# Patient Record
Sex: FEMALE | Race: WHITE | NOT HISPANIC OR LATINO | URBAN - METROPOLITAN AREA
[De-identification: names, ages, dates, MRNs, and addresses within clinical notes are randomized per-mention and may not be internally consistent; named-entity substitution may affect disease eponyms.]

---

## 2017-08-08 ENCOUNTER — HISTORICAL (OUTPATIENT)
Dept: ADMINISTRATIVE | Facility: HOSPITAL | Age: 67
End: 2017-08-08

## 2020-02-26 ENCOUNTER — OFFICE VISIT (OUTPATIENT)
Dept: URGENT CARE | Facility: CLINIC | Age: 70
End: 2020-02-26
Payer: MEDICARE

## 2020-02-26 VITALS
BODY MASS INDEX: 26.37 KG/M2 | HEIGHT: 67 IN | TEMPERATURE: 99 F | HEART RATE: 72 BPM | WEIGHT: 168 LBS | DIASTOLIC BLOOD PRESSURE: 79 MMHG | OXYGEN SATURATION: 96 % | RESPIRATION RATE: 18 BRPM | SYSTOLIC BLOOD PRESSURE: 158 MMHG

## 2020-02-26 DIAGNOSIS — J32.9 SINUSITIS, UNSPECIFIED CHRONICITY, UNSPECIFIED LOCATION: Primary | ICD-10-CM

## 2020-02-26 PROCEDURE — 99203 OFFICE O/P NEW LOW 30 MIN: CPT | Mod: S$GLB,,, | Performed by: FAMILY MEDICINE

## 2020-02-26 PROCEDURE — 99203 PR OFFICE/OUTPT VISIT, NEW, LEVL III, 30-44 MIN: ICD-10-PCS | Mod: S$GLB,,, | Performed by: FAMILY MEDICINE

## 2020-02-26 RX ORDER — PREDNISONE 20 MG/1
TABLET ORAL
Qty: 10 TABLET | Refills: 1 | Status: SHIPPED | OUTPATIENT
Start: 2020-02-26 | End: 2023-01-12 | Stop reason: ALTCHOICE

## 2020-02-26 RX ORDER — MONTELUKAST SODIUM 10 MG/1
TABLET ORAL
COMMUNITY
Start: 2020-02-06 | End: 2023-01-12

## 2020-02-26 RX ORDER — AZELASTINE 1 MG/ML
1 SPRAY, METERED NASAL 2 TIMES DAILY
Qty: 30 ML | Refills: 3 | Status: SHIPPED | OUTPATIENT
Start: 2020-02-26 | End: 2023-01-12

## 2020-02-26 NOTE — PROGRESS NOTES
"Subjective:       Patient ID: Marysol Reynolds is a 69 y.o. female.    Vitals:  height is 5' 7" (1.702 m) and weight is 76.2 kg (168 lb). Her oral temperature is 98.7 °F (37.1 °C). Her blood pressure is 158/79 (abnormal) and her pulse is 72. Her respiration is 18 and oxygen saturation is 96%.     Chief Complaint: Sinus Problem; URI; and Sore Throat    Pt presents today with complaints of fatigue, nasal/sinus pain, pressure, ear pressure. . Pt has just completed 2 rounds of antibiotics, Augmentin and Omnicef also given Tamiflu as precaution.     Sinus Problem   This is a new problem. The current episode started 1 to 4 weeks ago. The problem is unchanged. There has been no fever. The fever has been present for less than 1 day. The pain is mild. Associated symptoms include congestion, coughing, ear pain, sinus pressure and a sore throat. Pertinent negatives include no chills, diaphoresis, headaches, hoarse voice, neck pain, shortness of breath, sneezing or swollen glands.   URI    Associated symptoms include congestion, coughing, ear pain, sinus pain and a sore throat. Pertinent negatives include no headaches, nausea, neck pain, rash, sneezing, swollen glands, vomiting or wheezing.   Sore Throat    Associated symptoms include congestion, coughing and ear pain. Pertinent negatives include no headaches, hoarse voice, neck pain, shortness of breath, stridor, swollen glands or vomiting.       Constitution: Negative for chills, sweating, fatigue and fever.   HENT: Positive for ear pain, congestion, postnasal drip, sinus pain, sinus pressure and sore throat. Negative for voice change.    Neck: Negative for neck pain and painful lymph nodes.   Eyes: Negative for eye redness.   Respiratory: Positive for cough. Negative for chest tightness, sputum production, bloody sputum, COPD, shortness of breath, stridor, wheezing and asthma.    Gastrointestinal: Negative for nausea and vomiting.   Musculoskeletal: Negative for muscle ache. "   Skin: Negative for rash.   Allergic/Immunologic: Negative for seasonal allergies, asthma and sneezing.   Neurological: Negative for headaches.   Hematologic/Lymphatic: Negative for swollen lymph nodes.       Objective:      Physical Exam   Constitutional: She is oriented to person, place, and time. She appears well-developed and well-nourished. She is cooperative.  Non-toxic appearance. She does not have a sickly appearance. She does not appear ill. No distress.   HENT:   Head: Normocephalic and atraumatic.   Right Ear: Hearing, tympanic membrane, external ear and ear canal normal.   Left Ear: Hearing, tympanic membrane, external ear and ear canal normal.   Nose: Mucosal edema present. No rhinorrhea or nasal deformity. No epistaxis. Right sinus exhibits no maxillary sinus tenderness and no frontal sinus tenderness. Left sinus exhibits no maxillary sinus tenderness and no frontal sinus tenderness.   Mouth/Throat: Uvula is midline, oropharynx is clear and moist and mucous membranes are normal. No trismus in the jaw. Normal dentition. No uvula swelling. No oropharyngeal exudate, posterior oropharyngeal edema or posterior oropharyngeal erythema.   Eyes: Conjunctivae and lids are normal. No scleral icterus.   Neck: Trachea normal, full passive range of motion without pain and phonation normal. Neck supple. No neck rigidity. No edema and no erythema present.   Cardiovascular: Normal rate, regular rhythm, normal heart sounds, intact distal pulses and normal pulses.   Pulmonary/Chest: Effort normal and breath sounds normal. No respiratory distress. She has no decreased breath sounds. She has no rhonchi.   Abdominal: Normal appearance.   Musculoskeletal: Normal range of motion. She exhibits no edema or deformity.   Neurological: She is alert and oriented to person, place, and time. She exhibits normal muscle tone. Coordination normal.   Skin: Skin is warm, dry, intact, not diaphoretic and not pale.   Psychiatric: She has a  normal mood and affect. Her speech is normal and behavior is normal. Judgment and thought content normal. Cognition and memory are normal.   Nursing note and vitals reviewed.        Assessment:       1. Sinusitis, unspecified chronicity, unspecified location        Plan:       Advised that likely not bacterial and abx not indicated      Sinusitis, unspecified chronicity, unspecified location    Other orders  -     predniSONE (DELTASONE) 20 MG tablet; Take 40mg x2 days, 30 mg x2 days, 20mg x2 days, 10mg x2 days  Dispense: 10 tablet; Refill: 1  -     azelastine (ASTELIN) 137 mcg (0.1 %) nasal spray; 1 spray (137 mcg total) by Nasal route 2 (two) times daily.  Dispense: 30 mL; Refill: 3

## 2021-05-13 ENCOUNTER — OFFICE VISIT (OUTPATIENT)
Dept: URGENT CARE | Facility: CLINIC | Age: 71
End: 2021-05-13
Payer: MEDICARE

## 2021-05-13 VITALS
RESPIRATION RATE: 18 BRPM | DIASTOLIC BLOOD PRESSURE: 90 MMHG | TEMPERATURE: 97 F | HEIGHT: 68 IN | SYSTOLIC BLOOD PRESSURE: 145 MMHG | HEART RATE: 78 BPM | BODY MASS INDEX: 25.76 KG/M2 | OXYGEN SATURATION: 98 % | WEIGHT: 170 LBS

## 2021-05-13 DIAGNOSIS — S93.601A SPRAIN OF RIGHT FOOT, INITIAL ENCOUNTER: ICD-10-CM

## 2021-05-13 DIAGNOSIS — S99.921A FOOT INJURY, RIGHT, INITIAL ENCOUNTER: Primary | ICD-10-CM

## 2021-05-13 DIAGNOSIS — R03.0 ELEVATED BP WITHOUT DIAGNOSIS OF HYPERTENSION: ICD-10-CM

## 2021-05-13 PROCEDURE — 73630 X-RAY EXAM OF FOOT: CPT | Mod: RT,S$GLB,, | Performed by: RADIOLOGY

## 2021-05-13 PROCEDURE — 73630 XR FOOT COMPLETE 3 VIEW RIGHT: ICD-10-PCS | Mod: RT,S$GLB,, | Performed by: RADIOLOGY

## 2021-05-13 PROCEDURE — 99214 PR OFFICE/OUTPT VISIT, EST, LEVL IV, 30-39 MIN: ICD-10-PCS | Mod: S$GLB,,, | Performed by: FAMILY MEDICINE

## 2021-05-13 PROCEDURE — 99214 OFFICE O/P EST MOD 30 MIN: CPT | Mod: S$GLB,,, | Performed by: FAMILY MEDICINE

## 2022-04-07 ENCOUNTER — HISTORICAL (OUTPATIENT)
Dept: ADMINISTRATIVE | Facility: HOSPITAL | Age: 72
End: 2022-04-07
Payer: MEDICARE

## 2022-04-24 VITALS
DIASTOLIC BLOOD PRESSURE: 84 MMHG | WEIGHT: 172.63 LBS | SYSTOLIC BLOOD PRESSURE: 130 MMHG | OXYGEN SATURATION: 97 % | HEIGHT: 67 IN | BODY MASS INDEX: 27.09 KG/M2

## 2023-01-12 PROBLEM — M19.90 ARTHRITIS: Status: ACTIVE | Noted: 2023-01-12

## 2023-01-12 PROBLEM — F41.1 ANXIETY STATE: Status: ACTIVE | Noted: 2023-01-12

## 2024-04-03 ENCOUNTER — LAB REQUISITION (OUTPATIENT)
Dept: LAB | Facility: HOSPITAL | Age: 74
End: 2024-04-03
Payer: MEDICARE

## 2024-04-03 DIAGNOSIS — R30.0 DYSURIA: ICD-10-CM

## 2024-04-03 PROCEDURE — 87086 URINE CULTURE/COLONY COUNT: CPT | Performed by: OBSTETRICS & GYNECOLOGY

## 2024-04-05 LAB — BACTERIA UR CULT: ABNORMAL

## 2024-08-28 DIAGNOSIS — J01.80 ACUTE NON-RECURRENT SINUSITIS OF OTHER SINUS: Primary | ICD-10-CM

## 2024-08-28 RX ORDER — AMOXICILLIN AND CLAVULANATE POTASSIUM 875; 125 MG/1; MG/1
1 TABLET, FILM COATED ORAL 2 TIMES DAILY
Qty: 20 TABLET | Refills: 0 | Status: SHIPPED | OUTPATIENT
Start: 2024-08-28 | End: 2024-09-07

## 2024-08-28 NOTE — PROGRESS NOTES
8/28/2024:   Patient has a greater than one-week history of nasal and sinus congestion with cough and discolored nasal drainage.  Also with some malaise.  Patient has failed show improvement supportive therapy.  Suspect presence of acute sinusitis.  Plan:  Augmentin 875 1 tablet p.o. b.i.d. for 10 days.    Shankar Nichole M.D.

## 2025-01-29 PROBLEM — Z00.00 MEDICARE ANNUAL WELLNESS VISIT, SUBSEQUENT: Status: ACTIVE | Noted: 2025-01-29

## 2025-01-29 PROBLEM — E78.2 MIXED HYPERLIPIDEMIA: Status: ACTIVE | Noted: 2025-01-29

## 2025-06-02 ENCOUNTER — LAB REQUISITION (OUTPATIENT)
Dept: LAB | Facility: HOSPITAL | Age: 75
End: 2025-06-02
Payer: MEDICARE

## 2025-06-02 DIAGNOSIS — R30.0 DYSURIA: ICD-10-CM

## 2025-06-02 PROCEDURE — 87077 CULTURE AEROBIC IDENTIFY: CPT | Performed by: OBSTETRICS & GYNECOLOGY

## 2025-06-04 LAB — BACTERIA UR CULT: ABNORMAL
